# Patient Record
Sex: MALE | Race: WHITE | NOT HISPANIC OR LATINO | Employment: OTHER | URBAN - METROPOLITAN AREA
[De-identification: names, ages, dates, MRNs, and addresses within clinical notes are randomized per-mention and may not be internally consistent; named-entity substitution may affect disease eponyms.]

---

## 2024-01-02 ENCOUNTER — OFFICE VISIT (OUTPATIENT)
Dept: NEUROSURGERY | Facility: CLINIC | Age: 89
End: 2024-01-02
Payer: MEDICARE

## 2024-01-02 VITALS
SYSTOLIC BLOOD PRESSURE: 168 MMHG | RESPIRATION RATE: 16 BRPM | HEIGHT: 68 IN | BODY MASS INDEX: 24.25 KG/M2 | WEIGHT: 160 LBS | HEART RATE: 76 BPM | DIASTOLIC BLOOD PRESSURE: 84 MMHG

## 2024-01-02 DIAGNOSIS — M48.061 LUMBAR SPINAL STENOSIS DUE TO ADJACENT SEGMENT DISEASE AFTER FUSION PROCEDURE: Primary | ICD-10-CM

## 2024-01-02 DIAGNOSIS — M51.36 LUMBAR SPINAL STENOSIS DUE TO ADJACENT SEGMENT DISEASE AFTER FUSION PROCEDURE: Primary | ICD-10-CM

## 2024-01-02 PROCEDURE — 1125F AMNT PAIN NOTED PAIN PRSNT: CPT | Performed by: NEUROLOGICAL SURGERY

## 2024-01-02 PROCEDURE — 1159F MED LIST DOCD IN RCRD: CPT | Performed by: NEUROLOGICAL SURGERY

## 2024-01-02 PROCEDURE — 1036F TOBACCO NON-USER: CPT | Performed by: NEUROLOGICAL SURGERY

## 2024-01-02 PROCEDURE — 99213 OFFICE O/P EST LOW 20 MIN: CPT | Performed by: NEUROLOGICAL SURGERY

## 2024-01-02 PROCEDURE — 99203 OFFICE O/P NEW LOW 30 MIN: CPT | Performed by: NEUROLOGICAL SURGERY

## 2024-01-02 RX ORDER — AMLODIPINE BESYLATE 5 MG/1
5 TABLET ORAL DAILY
COMMUNITY

## 2024-01-02 RX ORDER — ROSUVASTATIN CALCIUM 10 MG/1
10 TABLET, COATED ORAL DAILY
COMMUNITY

## 2024-01-02 RX ORDER — PANTOPRAZOLE SODIUM 40 MG/1
40 TABLET, DELAYED RELEASE ORAL
COMMUNITY

## 2024-01-02 RX ORDER — TEMAZEPAM 7.5 MG/1
15 CAPSULE ORAL NIGHTLY PRN
COMMUNITY

## 2024-01-02 RX ORDER — LOSARTAN POTASSIUM 50 MG/1
100 TABLET ORAL DAILY
COMMUNITY

## 2024-01-02 ASSESSMENT — PAIN SCALES - GENERAL: PAINLEVEL: 1

## 2024-01-02 NOTE — PROGRESS NOTES
It was a pleasure to see Mr. Whiteside at the [facility] Sp[facility] at [facility], [place].     He is a really nice 89 y.o. male  who presents to us with complaints primarily axial LOWER BACK pain  that started about  6  years  ago, and have been gradually worsening since that time.  The symptoms started after no known injury    The pain is 7 /10. The pain is described as aching and occurs all day.  Symptoms are exacerbated by walking for more than a few minutes. Factors which relieve the pain include change in body position      Numbness and/or tingling - YES    Weakness : NO    Bladder/Bowel symptoms - NO    The patient has tried medications (eg: gabapentin, NSAIDS and narcotics ) : No  PT : Yes    Date: 2023  Pain Management with ESIs/selective nerve blocks  - YES    he is a NON-SMOKER and NON-DIABETIC    History of Depression : NO    PRIOR SPINE SURGERY: YES    Denies use of Aspirin, Coumadin, or Plavix or any other anticoagulants     NARCOTICS for pain : NO    Part of this patient’s history is from personal review of the patient’s previous charts.      No past medical history on file.      No current outpatient medications on file.      Review of Systems :   Constitutional: No fever or chills  Respiratory: No shortness of breath or wheezing  Musculoskeletal: see HPI above   Neurologic: See HPI above        EXAM:   There were no vitals filed for this visit.  NEURO: Neurologically patient is awake alert and oriented X 3    No obvious cranial nerve deficit.  Strength is almost 5/5 throughout all motor groups tested with no asymmetric significant motor deficit.  Deep tendon reflexes are symmetric throughout.   Gait : WNL   Sensory examination is intact to touch and painful stimuli.      IMAGING:   No imaging available to review.  Patient had an MRI done in April 2023 at [facility].    ASSESSMENT AND PLAN:  Tawanda Whiteside is a 89 y.o. male who underwent a spinal fusion in the lumbar spine by Dr. Rojo in  2017 at the [facility].  He did well from the surgery but now has been having significant low back and bilateral hip pain especially when he is up and around and tries to walk every time.  He however denies any focal weakness.  He mentions that every time he is sitting he feels okay but every time he gets symptoms returns he seems to significantly bothered by it.  He mentions that his MRI shows a severe stenosis but unfortunately he could not bring the films with him and I could not see the MRI.  I discussed with him that I we will request the imaging on PACS from [facility] and once I am able to review those I will get back in touch with him.    It was a pleasure to participate in Mr. Whiteside clinical care. All questions were answered to him satisfaction and he explained understanding of the further treatment plan.       Ja Colmenares MD, [facility]   of Neurological Surgery  [facility] [employer] [facility]  Attending Surgeon  Director - Minimally Invasive Spine Surgery  [place], [place]      Some of this note was completed using Dragon voice recognition technology and sometimes the software misinterprets words. This may include unintended errors with respect to translation of words, typographical errors or grammar errors which may not have been identified prior to finalization of the chart note. Please take this into account when reading this note